# Patient Record
Sex: FEMALE | URBAN - METROPOLITAN AREA
[De-identification: names, ages, dates, MRNs, and addresses within clinical notes are randomized per-mention and may not be internally consistent; named-entity substitution may affect disease eponyms.]

---

## 2019-04-09 ENCOUNTER — APPOINTMENT (RX ONLY)
Dept: URBAN - METROPOLITAN AREA CLINIC 168 | Facility: CLINIC | Age: 59
Setting detail: DERMATOLOGY
End: 2019-04-09

## 2019-04-09 DIAGNOSIS — L0391 CELLULITIS AND ABSCESS OF UNSPECIFIED SITES: ICD-10-CM

## 2019-04-09 DIAGNOSIS — L0390 CELLULITIS AND ABSCESS OF UNSPECIFIED SITES: ICD-10-CM

## 2019-04-09 PROBLEM — F32.9 MAJOR DEPRESSIVE DISORDER, SINGLE EPISODE, UNSPECIFIED: Status: ACTIVE | Noted: 2019-04-09

## 2019-04-09 PROBLEM — F41.9 ANXIETY DISORDER, UNSPECIFIED: Status: ACTIVE | Noted: 2019-04-09

## 2019-04-09 PROBLEM — E78.5 HYPERLIPIDEMIA, UNSPECIFIED: Status: ACTIVE | Noted: 2019-04-09

## 2019-04-09 PROBLEM — L03.818 CELLULITIS OF OTHER SITES: Status: ACTIVE | Noted: 2019-04-09

## 2019-04-09 PROBLEM — H91.90 UNSPECIFIED HEARING LOSS, UNSPECIFIED EAR: Status: ACTIVE | Noted: 2019-04-09

## 2019-04-09 PROCEDURE — ? COUNSELING

## 2019-04-09 PROCEDURE — ? DIAGNOSIS COMMENT

## 2019-04-09 PROCEDURE — ? PHOTO-DOCUMENTATION

## 2019-04-09 PROCEDURE — 99201: CPT

## 2019-04-09 NOTE — PROCEDURE: DIAGNOSIS COMMENT
Comment: suspect inadequate coverage w/ cephalexin for oral organisms....possible anaerobes. To ER strongly recommended.
Detail Level: Detailed

## 2019-04-09 NOTE — PROCEDURE: COUNSELING
Detail Level: Detailed
Patient Specific Counseling (Will Not Stick From Patient To Patient): Due to tense swelling, extreme pain described and deep dusky redness of digit, strongly recommend going to the ER for IV antibx and monitoring to r/o compartment - type syndrome of finger. The patient was very hesitant to go due to $$ concerns. \\nBriefly reviewed possibility to change to Clindamycin/Naproxen and closely monitor, but this is second choice, and includes continued concern for compartment issues. When the patient was asked to sign \"against medical advice\" form as she did not want to go to ER....she then reconsidered and went straight to the ER. \\n\\nNo Rx sent.

## 2019-04-09 NOTE — HPI: RASH
What Type Of Note Output Would You Prefer (Optional)?: Bullet Format
How Severe Is Your Rash?: severe
Is This A New Presentation, Or A Follow-Up?: Rash
Additional History: The patient is a hairdresser and works w/ water/chemicals.